# Patient Record
Sex: MALE
[De-identification: names, ages, dates, MRNs, and addresses within clinical notes are randomized per-mention and may not be internally consistent; named-entity substitution may affect disease eponyms.]

---

## 2024-08-02 ENCOUNTER — P2P PATIENT RECORD (OUTPATIENT)
Age: 69
End: 2024-08-02

## 2024-09-30 ENCOUNTER — DASHBOARD ENCOUNTERS (OUTPATIENT)
Age: 69
End: 2024-09-30

## 2024-10-02 ENCOUNTER — LAB OUTSIDE AN ENCOUNTER (OUTPATIENT)
Dept: URBAN - METROPOLITAN AREA CLINIC 7 | Facility: CLINIC | Age: 69
End: 2024-10-02

## 2024-10-02 ENCOUNTER — OFFICE VISIT (OUTPATIENT)
Dept: URBAN - METROPOLITAN AREA CLINIC 7 | Facility: CLINIC | Age: 69
End: 2024-10-02
Payer: COMMERCIAL

## 2024-10-02 VITALS — TEMPERATURE: 97.7 F | HEIGHT: 77 IN | WEIGHT: 315 LBS | BODY MASS INDEX: 37.19 KG/M2

## 2024-10-02 DIAGNOSIS — K83.8 COMMON BILE DUCT DILATION: ICD-10-CM

## 2024-10-02 DIAGNOSIS — K76.0 HEPATIC STEATOSIS: ICD-10-CM

## 2024-10-02 DIAGNOSIS — R79.89 ABNORMAL LIVER FUNCTION TESTS: ICD-10-CM

## 2024-10-02 DIAGNOSIS — F10.11 HISTORY OF ALCOHOL ABUSE: ICD-10-CM

## 2024-10-02 PROBLEM — 166603001: Status: ACTIVE | Noted: 2024-10-02

## 2024-10-02 PROBLEM — 123608004: Status: ACTIVE | Noted: 2024-10-02

## 2024-10-02 PROBLEM — 197321007: Status: ACTIVE | Noted: 2024-10-02

## 2024-10-02 PROBLEM — 371434005: Status: ACTIVE | Noted: 2024-10-02

## 2024-10-02 PROCEDURE — 99204 OFFICE O/P NEW MOD 45 MIN: CPT | Performed by: INTERNAL MEDICINE

## 2024-10-02 RX ORDER — ATORVASTATIN CALCIUM 20 MG/1
1 TABLET TABLET, FILM COATED ORAL ONCE A DAY
Qty: 90 TABLET | Status: ACTIVE | COMMUNITY

## 2024-10-02 RX ORDER — METFORMIN HYDROCHLORIDE 500 MG/1
1 TABLET WITH A MEAL TABLET, FILM COATED ORAL TWICE DAILY
Refills: 1 | Status: ACTIVE | COMMUNITY

## 2024-10-02 RX ORDER — GABAPENTIN 300 MG/1
1 CAPSULE CAPSULE ORAL
Refills: 0 | Status: ACTIVE | COMMUNITY

## 2024-10-02 RX ORDER — TAMSULOSIN HYDROCHLORIDE 0.4 MG/1
TAKE 1 CAPSULE BY MOUTH EVERY DAY AS DIRECTED CAPSULE ORAL
Qty: 90 EACH | Refills: 0 | Status: ACTIVE | COMMUNITY

## 2024-10-02 RX ORDER — ASPIRIN 325 MG/1
1 TABLET TABLET, FILM COATED ORAL TWICE DAILY
Status: ACTIVE | COMMUNITY

## 2024-10-02 RX ORDER — LOSARTAN POTASSIUM AND HYDROCHLOROTHIAZIDE 12.5; 5 MG/1; MG/1
TAKE 1 TABLET BY MOUTH EVERY DAY AS DIRECTED TABLET ORAL
Qty: 90 EACH | Refills: 1 | Status: ACTIVE | COMMUNITY

## 2024-10-02 NOTE — HPI-TODAY'S VISIT:
69 year old male presents today for abnormal imaging. Patient states he was just informed recently by Dr. Love after completing labs and Ultrasound showing fatty liver and abnormal liver function tests. Patient states he did have a heavy drinking problem for a few years. Patient states he has been off alcohol for a couple years. Patient denies change in bowel habits, no blood in stool, no abdominal pain, no fever, no nausea/vomiting. Discussed with patient completing further work up for fatty liver. Labs completed 07.27.2024 Total bili 0.6, Alk Phos 331, AST 56, . Patient completed Ultrasoud Abdomen on 08.02.2024 showing hepatomegaly with fatty liver and slightly prominent common bile duct is 0.6 cm.

## 2024-11-04 ENCOUNTER — LAB OUTSIDE AN ENCOUNTER (OUTPATIENT)
Dept: URBAN - METROPOLITAN AREA CLINIC 7 | Facility: CLINIC | Age: 69
End: 2024-11-04

## 2024-12-09 ENCOUNTER — TELEPHONE ENCOUNTER (OUTPATIENT)
Dept: URBAN - METROPOLITAN AREA CLINIC 9 | Facility: CLINIC | Age: 69
End: 2024-12-09

## 2024-12-10 PROBLEM — 1082431000119108: Status: ACTIVE | Noted: 2024-12-10

## 2024-12-11 ENCOUNTER — TELEPHONE ENCOUNTER (OUTPATIENT)
Dept: URBAN - METROPOLITAN AREA CLINIC 7 | Facility: CLINIC | Age: 69
End: 2024-12-11

## 2024-12-13 ENCOUNTER — TELEPHONE ENCOUNTER (OUTPATIENT)
Dept: URBAN - METROPOLITAN AREA CLINIC 9 | Facility: CLINIC | Age: 69
End: 2024-12-13

## 2024-12-13 ENCOUNTER — P2P PATIENT RECORD (OUTPATIENT)
Age: 69
End: 2024-12-13

## 2024-12-16 ENCOUNTER — OFFICE VISIT (OUTPATIENT)
Dept: URBAN - METROPOLITAN AREA SURGERY CENTER 9 | Facility: SURGERY CENTER | Age: 69
End: 2024-12-16

## 2024-12-23 ENCOUNTER — TELEPHONE ENCOUNTER (OUTPATIENT)
Dept: URBAN - METROPOLITAN AREA CLINIC 7 | Facility: CLINIC | Age: 69
End: 2024-12-23

## 2025-04-10 ENCOUNTER — OFFICE VISIT (OUTPATIENT)
Dept: URBAN - METROPOLITAN AREA CLINIC 7 | Facility: CLINIC | Age: 70
End: 2025-04-10

## 2025-04-10 RX ORDER — LOSARTAN POTASSIUM AND HYDROCHLOROTHIAZIDE 12.5; 5 MG/1; MG/1
TAKE 1 TABLET BY MOUTH EVERY DAY AS DIRECTED TABLET ORAL
Qty: 90 EACH | Refills: 1 | COMMUNITY

## 2025-04-10 RX ORDER — ATORVASTATIN CALCIUM 20 MG/1
1 TABLET TABLET, FILM COATED ORAL ONCE A DAY
Qty: 90 TABLET | COMMUNITY

## 2025-04-10 RX ORDER — METFORMIN HYDROCHLORIDE 500 MG/1
1 TABLET WITH A MEAL TABLET, FILM COATED ORAL TWICE DAILY
Refills: 1 | COMMUNITY

## 2025-04-10 RX ORDER — GABAPENTIN 300 MG/1
1 CAPSULE CAPSULE ORAL
Refills: 0 | COMMUNITY

## 2025-04-10 RX ORDER — TAMSULOSIN HYDROCHLORIDE 0.4 MG/1
TAKE 1 CAPSULE BY MOUTH EVERY DAY AS DIRECTED CAPSULE ORAL
Qty: 90 EACH | Refills: 0 | COMMUNITY

## 2025-04-10 RX ORDER — ASPIRIN 325 MG/1
1 TABLET TABLET, FILM COATED ORAL TWICE DAILY
COMMUNITY

## 2025-04-22 ENCOUNTER — OFFICE VISIT (OUTPATIENT)
Dept: URBAN - METROPOLITAN AREA CLINIC 7 | Facility: CLINIC | Age: 70
End: 2025-04-22
Payer: COMMERCIAL

## 2025-04-22 DIAGNOSIS — R79.89 ABNORMAL LIVER FUNCTION TESTS: ICD-10-CM

## 2025-04-22 DIAGNOSIS — K76.0 HEPATIC STEATOSIS: ICD-10-CM

## 2025-04-22 DIAGNOSIS — K80.50 CHOLEDOCHOLITHIASIS: ICD-10-CM

## 2025-04-22 DIAGNOSIS — F10.11 HISTORY OF ALCOHOL ABUSE: ICD-10-CM

## 2025-04-22 DIAGNOSIS — K83.8 COMMON BILE DUCT DILATION: ICD-10-CM

## 2025-04-22 DIAGNOSIS — L29.9 PRURITUS: ICD-10-CM

## 2025-04-22 PROBLEM — 1240423001: Status: ACTIVE | Noted: 2025-04-22

## 2025-04-22 PROCEDURE — 99214 OFFICE O/P EST MOD 30 MIN: CPT

## 2025-04-22 RX ORDER — GABAPENTIN 300 MG/1
1 CAPSULE CAPSULE ORAL
Refills: 0 | Status: ACTIVE | COMMUNITY

## 2025-04-22 RX ORDER — TAMSULOSIN HYDROCHLORIDE 0.4 MG/1
TAKE 1 CAPSULE BY MOUTH EVERY DAY AS DIRECTED CAPSULE ORAL
Qty: 90 EACH | Refills: 0 | Status: ACTIVE | COMMUNITY

## 2025-04-22 RX ORDER — ASPIRIN 325 MG/1
1 TABLET TABLET, FILM COATED ORAL TWICE DAILY
Status: ACTIVE | COMMUNITY

## 2025-04-22 RX ORDER — METFORMIN HYDROCHLORIDE 500 MG/1
1 TABLET WITH A MEAL TABLET, FILM COATED ORAL TWICE DAILY
Refills: 1 | Status: ACTIVE | COMMUNITY

## 2025-04-22 RX ORDER — ATORVASTATIN CALCIUM 20 MG/1
1 TABLET TABLET, FILM COATED ORAL ONCE A DAY
Qty: 90 TABLET | Status: ACTIVE | COMMUNITY

## 2025-04-22 RX ORDER — LOSARTAN POTASSIUM AND HYDROCHLOROTHIAZIDE 12.5; 5 MG/1; MG/1
TAKE 1 TABLET BY MOUTH EVERY DAY AS DIRECTED TABLET ORAL
Qty: 90 EACH | Refills: 1 | Status: ACTIVE | COMMUNITY

## 2025-04-22 NOTE — HPI-TODAY'S VISIT:
Patient is a 70-year-old male presenting for follow-up evaluation after imaging. Hx choledocholithiasis, fatty liver, diabetes mellitus, HTN, and HLD.  He complains on ongoing abdominal pain present for the last year or so. He describes the pain as squeezing and pressure sensation. The pain is localized to the epigastric / LLQ region. Has been associated with nausea, vomiting, loss of appetite, pruritis, chills, and dark colored urine. The itching has been very bothersome. Denies change in bowel habits, blood in stool, or yellowing of the skin or eyes. Has daily stool output. Hx heavy alcohol use, has about one drink once per month now. He does admit to unintentional weight loss, about 50 lb in the last year. He attributes this to being busy and low appetite at times. Down 32 lb since last seen. MRCP 12/2024 revealed an 8 mm stone within the distal common bile duct and mild hepatic steatosis.   Last seen by Dr. Cosme in 10/2024 for abnormal liver function tests/US. Labs and FibroScan ordered. EUS subsequently ordered given findings of prominent CBD on ultrasound however the patient cancelled this procedure. Subsequent MRCP demonstrated an 8 mm stone in the distal CBD, and ERCP was recommended. He has been lost to follow up until now.   Imaging/Labs: -MRI/MRCP 12/2024 revealed an 8 mm stone within the distal common bile duct with associated mild extrahepatic and central intrahepatic biliary ductal dilation.  Recommend ERCP.  Mild hepatic steatosis.  Several small right renal cortical cysts. -FibroScan 11/2024 revealed severe steatosis and minimal fibrosis (S3, F1).  -External Ultrasoud Abdomen on 08.02.2024 showing hepatomegaly with fatty liver and slightly prominent common bile duct is 0.6 cm. -External labs completed 07.27.2024 Total bili 0.6, Alk Phos 331, AST 56, .

## 2025-04-25 ENCOUNTER — TELEPHONE ENCOUNTER (OUTPATIENT)
Dept: URBAN - METROPOLITAN AREA CLINIC 7 | Facility: CLINIC | Age: 70
End: 2025-04-25

## 2025-05-02 ENCOUNTER — TELEPHONE ENCOUNTER (OUTPATIENT)
Dept: URBAN - METROPOLITAN AREA CLINIC 7 | Facility: CLINIC | Age: 70
End: 2025-05-02